# Patient Record
Sex: MALE | Race: WHITE
[De-identification: names, ages, dates, MRNs, and addresses within clinical notes are randomized per-mention and may not be internally consistent; named-entity substitution may affect disease eponyms.]

---

## 2018-08-01 ENCOUNTER — HOSPITAL ENCOUNTER (OUTPATIENT)
Dept: HOSPITAL 74 - FASU | Age: 78
Discharge: HOME | End: 2018-08-01
Attending: ORTHOPAEDIC SURGERY
Payer: COMMERCIAL

## 2018-08-01 VITALS — DIASTOLIC BLOOD PRESSURE: 60 MMHG | HEART RATE: 52 BPM | SYSTOLIC BLOOD PRESSURE: 126 MMHG | TEMPERATURE: 98 F

## 2018-08-01 VITALS — BODY MASS INDEX: 27.4 KG/M2

## 2018-08-01 DIAGNOSIS — G89.29: ICD-10-CM

## 2018-08-01 DIAGNOSIS — M96.1: Primary | ICD-10-CM

## 2018-08-01 DIAGNOSIS — M54.5: ICD-10-CM

## 2018-08-01 PROCEDURE — 00HU0MZ INSERTION OF NEUROSTIMULATOR LEAD INTO SPINAL CANAL, OPEN APPROACH: ICD-10-PCS | Performed by: ORTHOPAEDIC SURGERY

## 2018-08-01 PROCEDURE — 63685 INS/RPLC SPI NPG/RCVR POCKET: CPT

## 2018-08-01 PROCEDURE — 0JH70EZ INSERTION OF MULTIPLE ARRAY RECHARGEABLE STIMULATOR GENERATOR INTO BACK SUBCUTANEOUS TISSUE AND FASCIA, OPEN APPROACH: ICD-10-PCS | Performed by: ORTHOPAEDIC SURGERY

## 2018-08-01 PROCEDURE — 63655 IMPLANT NEUROELECTRODES: CPT

## 2018-08-01 PROCEDURE — 95972 ALYS CPLX SP/PN NPGT W/PRGRM: CPT

## 2018-08-01 PROCEDURE — 4B00XVZ MEASUREMENT OF CENTRAL NERVOUS STIMULATOR, EXTERNAL APPROACH: ICD-10-PCS | Performed by: ORTHOPAEDIC SURGERY

## 2018-08-01 NOTE — SURG
Surgery First Assist Note


First Assist: Juliana Yanez PA-C


Date of Service: 08/01/18


Diagnosis: 





post laminectomy syndrome


Procedure: 





T7-T8 Laminotomy with insertion of spinal cord stimulator paddle and permanent 

generator, intraop impedance testing


I was present for the entirety of the operative procedure. For further detail, 

please refer to operative report.








Visit type





- Case Type


Case Type: Scheduled





- Emergency


Emergency Visit: No





- New patient


This patient is new to me today: Yes


Date on this admission: 08/01/18

## 2018-08-01 NOTE — OP
Operative Note





- Note:


Operative Date: 08/01/18


Pre-Operative Diagnosis: post lamiectomy syndrome


Operation: T7-T8 Laminotomy with insertion of spinal cord stimulator paddle and 

permanent generator, intraop impedance testing


Implants: spinal cord stimulator and permanent generator


Post-Operative Diagnosis: Same as Pre-op


Surgeon: Nghia Murphy


Assistant: Juliana Yanez


Anesthesiologist/CRNA: Zachary Arboleda


Anesthesia: General


Estimated Blood Loss (mls): 10


Fluid Volume Replaced (mls): 800


Operative Report Dictated: Yes

## 2018-08-01 NOTE — OP
DATE OF OPERATION:  08/01/2018

 

PREOPERATIVE DIAGNOSES:  

1.  Post laminectomy syndrome.  

2.  Chronic low back pain. 

 

POSTOPERATIVE DIAGNOSES:  

1.  Post laminectomy syndrome.  

2.  Chronic low back pain. 

 

PROCEDURES:  

1.  Thoracic laminotomy at T7 for insertion of spinal cord stimulator paddle.  

2.  Insertion of WaveWriter from [employer] Spinal Cord Stimulator Generator.

3.  Intraoperative impedance testing.

 

SURGEON:  Nghia Murphy MD 

 

ASSISTANT:  KAILEY Turcios

 

ANESTHESIA:  General. 

 

INDICATIONS:  The patient is a 78-year-old male who has had a history of extensive

lumbar surgery and chronic back pain.  The patients pain has been refractory to

management with multiple oral medications, and he has had chronic debilitating pain. 

He underwent a spinal cord stimulator trial, which showed excellent relief of his

chronic back pain.  He is indicated for permanent spinal cord generator and paddle

insertion.  

 

DESCRIPTION OF THE PROCEDURE:  The patient was brought into the operating room via

stretcher and transferred onto the OR table in the prone position.  He was placed in

chest rolls and pillows, and all bony prominences were padded.  The back was then

prepped and draped in the usual sterile fashion.  Under fluoroscopic guidance, an

incision was then mapped out for entry at the T7 lamina.   Time-out was performed and

prophylactic IV antibiotics were administered.  

 

An appropriate incision was then made, and dissection was carried down to the level

of the fascia.  The fascia was split with electrocautery exposing the subperiosteal

spine at the appropriate level.  A hemilaminotomy of the T7 inferior lamina was then

performed exposing the ligamentum flavum.  The flavum was then excised exposing the

thoracic spinal cord.  A [employer] Paddle was then inserted under

fluoroscopic guidance to the appropriate level spanning T7 and extending into the

vertebral body of T6.  Impedance testing showed excellent result.  An incision was

then made over the right iliac crest, and a subcutaneous pocket was then made with

blunt dissection.  

 

A tunneler was then used to tunnel the leads.  Prior to tunneling the leads, I did

secure No. 1 and No. 4 leads with [employer] Clik X anchors and suturing

device.  Tension loops were then left in the upper thoracic spine.  The leads were

then connected to the generator, and impedance testing again showed excellent results

with the generator in the pocket.  The leads were then locked into place, and the

tension loop was placed posterior to the battery, and the battery was then placed

into the pocket.  

 

The wounds were then irrigated, and the deep fascia was closed with No. 1 Vicryl

suture, the deep dermal tissue approximated with 2-0 Vicryl suture, and skin was

closed with 3-0 nylon suture.  Sterile dressing was applied.  The patient was then

flipped into the supine position having tolerated the procedure well. 

 

KAILEY Turcios, was necessary throughout the case to properly assist in the

retraction of the neural elements and placement of the generator.  This could not

have been done without a skilled surgical assistant.  

 

 

MALINA [id number]

DD: 08/01/2018 10:47

DT: 08/01/2018 12:23

Job #:  [id number]